# Patient Record
Sex: MALE | Race: WHITE | NOT HISPANIC OR LATINO | URBAN - METROPOLITAN AREA
[De-identification: names, ages, dates, MRNs, and addresses within clinical notes are randomized per-mention and may not be internally consistent; named-entity substitution may affect disease eponyms.]

---

## 2017-03-23 ENCOUNTER — AMBULATORY SURGICAL CENTER (OUTPATIENT)
Dept: URBAN - METROPOLITAN AREA SURGERY 2 | Facility: SURGERY | Age: 57
End: 2017-03-23
Payer: COMMERCIAL

## 2017-03-23 ENCOUNTER — OFFICE (OUTPATIENT)
Dept: URBAN - METROPOLITAN AREA CLINIC 11 | Facility: CLINIC | Age: 57
End: 2017-03-23
Payer: COMMERCIAL

## 2017-03-23 VITALS
OXYGEN SATURATION: 100 % | TEMPERATURE: 97.7 F | DIASTOLIC BLOOD PRESSURE: 57 MMHG | HEIGHT: 74 IN | SYSTOLIC BLOOD PRESSURE: 120 MMHG | HEART RATE: 70 BPM | OXYGEN SATURATION: 99 % | SYSTOLIC BLOOD PRESSURE: 123 MMHG | SYSTOLIC BLOOD PRESSURE: 135 MMHG | DIASTOLIC BLOOD PRESSURE: 76 MMHG | SYSTOLIC BLOOD PRESSURE: 125 MMHG | DIASTOLIC BLOOD PRESSURE: 73 MMHG | SYSTOLIC BLOOD PRESSURE: 125 MMHG | RESPIRATION RATE: 16 BRPM | RESPIRATION RATE: 18 BRPM | HEART RATE: 64 BPM | DIASTOLIC BLOOD PRESSURE: 73 MMHG | DIASTOLIC BLOOD PRESSURE: 76 MMHG | DIASTOLIC BLOOD PRESSURE: 57 MMHG | OXYGEN SATURATION: 100 % | RESPIRATION RATE: 18 BRPM | TEMPERATURE: 97.9 F | OXYGEN SATURATION: 99 % | SYSTOLIC BLOOD PRESSURE: 135 MMHG | DIASTOLIC BLOOD PRESSURE: 70 MMHG | TEMPERATURE: 97.7 F | WEIGHT: 220 LBS | HEART RATE: 72 BPM | SYSTOLIC BLOOD PRESSURE: 123 MMHG | HEART RATE: 72 BPM | SYSTOLIC BLOOD PRESSURE: 120 MMHG | HEART RATE: 70 BPM | HEART RATE: 65 BPM | HEART RATE: 65 BPM | TEMPERATURE: 97.9 F | DIASTOLIC BLOOD PRESSURE: 70 MMHG | RESPIRATION RATE: 16 BRPM | HEIGHT: 74 IN | HEART RATE: 64 BPM | WEIGHT: 220 LBS

## 2017-03-23 DIAGNOSIS — K63.89 OTHER SPECIFIED DISEASES OF INTESTINE: ICD-10-CM

## 2017-03-23 DIAGNOSIS — K57.30 DIVERTICULOSIS OF LARGE INTESTINE WITHOUT PERFORATION OR ABS: ICD-10-CM

## 2017-03-23 DIAGNOSIS — K62.89 OTHER SPECIFIED DISEASES OF ANUS AND RECTUM: ICD-10-CM

## 2017-03-23 DIAGNOSIS — K64.8 OTHER HEMORRHOIDS: ICD-10-CM

## 2017-03-23 DIAGNOSIS — K92.1 MELENA: ICD-10-CM

## 2017-03-23 PROBLEM — K92.2 EVALUATION OF UNEXPLAINED GI BLEEDING: Status: ACTIVE | Noted: 2017-03-23

## 2017-03-23 PROBLEM — D12.3 BENIGN NEOPLASM OF TRANSVERSE COLON: Status: ACTIVE | Noted: 2017-03-23

## 2017-03-23 PROBLEM — D12.4 BENIGN NEOPLASM OF DESCENDING COLON: Status: ACTIVE | Noted: 2017-03-23

## 2017-03-23 PROCEDURE — 88305 TISSUE EXAM BY PATHOLOGIST: CPT | Performed by: INTERNAL MEDICINE

## 2017-03-23 PROCEDURE — 88342 IMHCHEM/IMCYTCHM 1ST ANTB: CPT | Performed by: INTERNAL MEDICINE

## 2017-03-23 PROCEDURE — 45380 COLONOSCOPY AND BIOPSY: CPT | Performed by: INTERNAL MEDICINE

## 2017-03-23 PROCEDURE — 88313 SPECIAL STAINS GROUP 2: CPT | Performed by: INTERNAL MEDICINE

## 2018-03-30 NOTE — PATIENT DISCUSSION
Posterior capsular opacification was noted. It was not causing any decreased vision or glare at this time. No treatment was recommended at this time.

## 2021-11-08 ENCOUNTER — NEW PATIENT COMPREHENSIVE (OUTPATIENT)
Dept: URBAN - METROPOLITAN AREA CLINIC 47 | Facility: CLINIC | Age: 61
End: 2021-11-08

## 2021-11-08 DIAGNOSIS — H52.203: ICD-10-CM

## 2021-11-08 DIAGNOSIS — H52.4: ICD-10-CM

## 2021-11-08 DIAGNOSIS — H25.13: ICD-10-CM

## 2021-11-08 DIAGNOSIS — H52.03: ICD-10-CM

## 2021-11-08 DIAGNOSIS — E11.9: ICD-10-CM

## 2021-11-08 PROCEDURE — 92004 COMPRE OPH EXAM NEW PT 1/>: CPT

## 2021-11-08 PROCEDURE — 92015 DETERMINE REFRACTIVE STATE: CPT

## 2021-11-08 ASSESSMENT — VISUAL ACUITY
OD_SC: J8
OS_SC: 20/25-2
OD_CC: 20/20
OS_CC: J2
OU_SC: J4
OD_SC: 20/25
OD_CC: J3
OU_CC: 20/20
OU_CC: J1
OU_SC: 20/25
OS_SC: J8
OS_CC: 20/20

## 2021-11-08 ASSESSMENT — TONOMETRY
OS_IOP_MMHG: 20
OD_IOP_MMHG: 21

## 2022-09-26 ENCOUNTER — COMPREHENSIVE EXAM (OUTPATIENT)
Dept: URBAN - METROPOLITAN AREA CLINIC 43 | Facility: CLINIC | Age: 62
End: 2022-09-26

## 2022-09-26 DIAGNOSIS — H25.13: ICD-10-CM

## 2022-09-26 DIAGNOSIS — H52.4: ICD-10-CM

## 2022-09-26 DIAGNOSIS — H52.03: ICD-10-CM

## 2022-09-26 DIAGNOSIS — E11.9: ICD-10-CM

## 2022-09-26 PROCEDURE — 92015 DETERMINE REFRACTIVE STATE: CPT

## 2022-09-26 PROCEDURE — 92014 COMPRE OPH EXAM EST PT 1/>: CPT

## 2022-09-26 ASSESSMENT — VISUAL ACUITY
OS_CC: J2
OS_SC: 20/60-2
OS_SC: J8-
OD_CC: 20/30+2
OD_SC: 20/30-1
OD_SC: J10
OD_CC: J3
OS_CC: 20/30+2

## 2022-09-26 ASSESSMENT — TONOMETRY
OD_IOP_MMHG: 16
OS_IOP_MMHG: 16

## 2024-09-30 ENCOUNTER — COMPREHENSIVE EXAM (OUTPATIENT)
Dept: URBAN - METROPOLITAN AREA CLINIC 43 | Facility: CLINIC | Age: 64
End: 2024-09-30

## 2024-09-30 DIAGNOSIS — E11.9: ICD-10-CM

## 2024-09-30 DIAGNOSIS — H25.13: ICD-10-CM

## 2024-09-30 DIAGNOSIS — H52.203: ICD-10-CM

## 2024-09-30 DIAGNOSIS — H52.4: ICD-10-CM

## 2024-09-30 PROCEDURE — 92015 DETERMINE REFRACTIVE STATE: CPT

## 2024-09-30 PROCEDURE — 92014 COMPRE OPH EXAM EST PT 1/>: CPT
